# Patient Record
Sex: FEMALE | Race: BLACK OR AFRICAN AMERICAN | NOT HISPANIC OR LATINO | URBAN - NONMETROPOLITAN AREA
[De-identification: names, ages, dates, MRNs, and addresses within clinical notes are randomized per-mention and may not be internally consistent; named-entity substitution may affect disease eponyms.]

---

## 2016-09-12 PROBLEM — E11.9: Noted: 2020-07-15

## 2016-09-12 PROBLEM — H25.13: Noted: 2020-07-15

## 2019-09-09 ENCOUNTER — IMPORTED ENCOUNTER (OUTPATIENT)
Dept: URBAN - NONMETROPOLITAN AREA CLINIC 1 | Facility: CLINIC | Age: 74
End: 2019-09-09

## 2019-09-09 PROBLEM — E11.9: Noted: 2019-09-09

## 2019-09-09 PROCEDURE — 92014 COMPRE OPH EXAM EST PT 1/>: CPT

## 2019-09-09 PROCEDURE — 92015 DETERMINE REFRACTIVE STATE: CPT

## 2019-09-09 PROCEDURE — 92250 FUNDUS PHOTOGRAPHY W/I&R: CPT

## 2019-09-09 NOTE — PATIENT DISCUSSION
IDDM sans retinopathy OUDiscussed diagnosis with patient. Discussed the risk of diabetic damage of the retina with potential vision loss and the importance of routine follow-up. Emphasized strict blood sugar control. Optos done today; no BDR OUContinue to [place]ino OUDiscussed diagnosis with patientReviewed symptoms related to cataract progressionRecommend cataract evaluation by Dr. Hiral Molina defers treatment at this timeWill [facility] OS Discussed diagnosis with patientDiscussed findings of exam in detail with the patient. The risk of retinal detachment in patients with PVDs was discussed with the patient and the warning signs of retinal detachment were carefully reviewed with the patient. The patient was warned to return to the office or contact the ophthalmologist on call immediately if they experience signs of retinal detachment.   Presbyopia OUDiscussed diagnosis in detail with patientContinue to monitor; Dr's Notes: MR 12/15/17DFE 9/9/19OCT 6/15/18Optos 9/9/19

## 2020-02-24 NOTE — PATIENT DISCUSSION
New Prescription: erythromycin (erythromycin): ointment: 5 mg/gram (0.5 %) 1 a small amount at bedtime into right eye 02-

## 2020-02-24 NOTE — PATIENT DISCUSSION
BLUNT TRAUMA TO R ORBITAL WITH CONJ LACERATION AFTER FALL OD: AGGRESSIVE LUBRICATION. EMYCIN QHS. NO RETINAL HOLES OR TEARS. CALLBACK INSTRUCTIONS GIVEN.   FU WITH PCP, CONSIDER CT ORBITS FOR EVALUATION OF FRACTURES

## 2020-07-15 ENCOUNTER — IMPORTED ENCOUNTER (OUTPATIENT)
Dept: URBAN - NONMETROPOLITAN AREA CLINIC 1 | Facility: CLINIC | Age: 75
End: 2020-07-15

## 2020-07-15 PROCEDURE — 92014 COMPRE OPH EXAM EST PT 1/>: CPT

## 2020-07-15 NOTE — PATIENT DISCUSSION
Cataract(s)-Visually significant cataract OU .-Cataract(s) causing symptomatic impairment of visual function not correctable with a tolerable change in glasses or contact lenses lighting or non-operative means resulting in specific activity limitations and/or participation restrictions including but not limited to reading viewing television driving or meeting vocational or recreational needs. -Expectation is clearer vision and functional improvement in symptoms as well as reduced glare disability after cataract removal.-Order IOLMaster and OPD today. -Recommend Standard/traditional based on today's OPD testing and lifestyle questionnaire.-All questions were answered regarding surgery including pre and post-op medications appointments activity restrictions and anesthetic usage.-The risks benefits and alternatives and special risk factors for the patient were discussed in detail including but not limited to: bleeding infection retinal detachment vitreous loss problems with the implant and possible need for additional surgery.-Although rare the possibility of complete vision loss was discussed.-The possible need for glasses post-operatively was discussed.-Order medical clearance exam based on history of DM-Patient elects to proceed with cataract surgery OD .  Will schedule at patient's convenience and re-evaluate OS  in the future.; 's Notes: MR 12/15/17DFE 9/9/19OCT 6/15/18Optos 9/9/19

## 2020-08-17 ENCOUNTER — IMPORTED ENCOUNTER (OUTPATIENT)
Dept: URBAN - NONMETROPOLITAN AREA CLINIC 1 | Facility: CLINIC | Age: 75
End: 2020-08-17

## 2020-08-17 PROBLEM — E78.5: Noted: 2020-08-17

## 2020-08-17 PROBLEM — I10: Noted: 2020-08-17

## 2020-08-17 PROBLEM — E11.9: Noted: 2020-07-15

## 2020-08-17 PROBLEM — Z01.818: Noted: 2020-08-17

## 2020-08-17 NOTE — PATIENT DISCUSSION
Medical Clearance-Medical clearance done today. -No outstanding concerns that would preclude surgery.-Patient is cleared to proceed with scheduled surgery.; 's Notes: MR 12/15/17DFE 9/9/19OCT 6/15/18Optos 9/9/19

## 2020-08-25 ENCOUNTER — IMPORTED ENCOUNTER (OUTPATIENT)
Dept: URBAN - NONMETROPOLITAN AREA CLINIC 1 | Facility: CLINIC | Age: 75
End: 2020-08-25

## 2020-08-25 PROBLEM — Z98.41: Noted: 2020-08-25

## 2020-08-25 PROCEDURE — 99024 POSTOP FOLLOW-UP VISIT: CPT

## 2020-08-25 NOTE — PATIENT DISCUSSION
1 Day POV CE OD 8/24/20 Standard IOL-  The pt has undergone successful cataract extraction with Intraocular lens implantation in the right eye.-  IOP noted at 28 OD today instilled one drop of Alphagan P OD while in office today. -  Asked patient to continue postop medication regimen and add Alphagan OD QAM with sample given in office today. -  PO examination is normal and visual acuity has improved. -  The pt has been instructed to call the office immediately if there is increased redness pain or vision loss. -  Instructed patient not to rub the eye and don’t go swimming. -  Pt should return in 1 week for follow up.  -  Ocular meds plan discussed and patient received printed take home instructions.; 's Notes: MR 12/15/17DFE 9/9/19OCT 6/15/18Optos 9/9/19

## 2020-09-01 ENCOUNTER — IMPORTED ENCOUNTER (OUTPATIENT)
Dept: URBAN - NONMETROPOLITAN AREA CLINIC 1 | Facility: CLINIC | Age: 75
End: 2020-09-01

## 2020-09-01 PROCEDURE — 99024 POSTOP FOLLOW-UP VISIT: CPT

## 2020-09-01 NOTE — PATIENT DISCUSSION
Cataract(s)-Visually significant cataract OS . -Cataract(s) causing symptomatic impairment of visual function not correctable with a tolerable change in glasses or contact lenses lighting or non-operative means resulting in specific activity limitations and/or participation restrictions including but not limited to reading viewing television driving or meeting vocational or recreational needs. -Expectation is clearer vision and functional improvement in symptoms as well as reduced glare disability after cataract removal.-Recommend standard/traditional based on previous OPD testing and lifestyle questionnaire.-All questions were answered regarding surgery including pre and post-op medications appointments activity restrictions and anesthetic usage.-The risks benefits and alternatives and special risk factors for the patient were discussed in detail including but not limited to: bleeding infection retinal detachment vitreous loss problems with the implant and possible need for additional surgery.-Although rare the possibility of complete vision loss was discussed.-The need for glasses post-operatively was discussed.-Patient elects to proceed with cataract surgery OS . Will schedule at patient's convenience. s/p PCIOL-Pt doing well at 1 week s/p PCIOL. -Continue post-op gtts according to instruction sheet.-Okay to resume usual activites and d/c eye shield.; 's Notes: MR 12/15/17DFE 9/9/19OCT 6/15/18Optos 9/9/19

## 2020-09-03 PROBLEM — Z98.41: Noted: 2020-08-25

## 2020-09-03 PROBLEM — H25.12: Noted: 2020-09-03

## 2020-09-10 ENCOUNTER — IMPORTED ENCOUNTER (OUTPATIENT)
Dept: URBAN - NONMETROPOLITAN AREA CLINIC 1 | Facility: CLINIC | Age: 75
End: 2020-09-10

## 2020-09-10 PROBLEM — Z98.42: Noted: 2020-09-10

## 2020-09-10 PROBLEM — Z98.41: Noted: 2020-08-25

## 2020-09-10 PROCEDURE — 99024 POSTOP FOLLOW-UP VISIT: CPT

## 2020-09-10 NOTE — PATIENT DISCUSSION
1-Day POV CE OS 9/9/20 CE OD 8/24/20 Standard IOL OU- Discussed diagnosis in detail with patient- Patient is stable and doing well- Wound intact- Continue all post op drops as directed- Continue to monitor- RTC 1 week POV; 's Notes: MR 12/15/17DFE 9/9/19OCT 6/15/18Optos 9/9/19

## 2020-09-14 ENCOUNTER — IMPORTED ENCOUNTER (OUTPATIENT)
Dept: URBAN - NONMETROPOLITAN AREA CLINIC 1 | Facility: CLINIC | Age: 75
End: 2020-09-14

## 2020-09-14 PROCEDURE — 99024 POSTOP FOLLOW-UP VISIT: CPT

## 2020-09-14 NOTE — PATIENT DISCUSSION
5-Day POV CE OS 9/9/20 CE OD 8/24/20 Standard IOL OU- Discussed diagnosis in detail with patient- Patient is stable and doing well- Wound intact- Continue all post op drops as directed- Continue to monitor- RTC 3 week POV with ; 's Notes:  12/15/17DFE 9/9/19OCT 6/15/18Optos 9/9/19

## 2020-10-16 ENCOUNTER — IMPORTED ENCOUNTER (OUTPATIENT)
Dept: URBAN - NONMETROPOLITAN AREA CLINIC 1 | Facility: CLINIC | Age: 75
End: 2020-10-16

## 2020-10-16 PROCEDURE — 99024 POSTOP FOLLOW-UP VISIT: CPT

## 2020-10-16 NOTE — PATIENT DISCUSSION
4 week POV CE OS 9/9/20 CE OD 8/24/20 Standard IOL OU- Discussed diagnosis in detail with patient- Patient is stable and doing well- Wound intact- Finish all post op drops as directed- New glasses Rx given today- Continue to monitor- RTC 3 months for follow up with DFE; 's Notes: MR  10/16/20DFE 9/9/19OCT 6/15/18Optos 9/9/19

## 2021-01-25 ENCOUNTER — IMPORTED ENCOUNTER (OUTPATIENT)
Dept: URBAN - NONMETROPOLITAN AREA CLINIC 1 | Facility: CLINIC | Age: 76
End: 2021-01-25

## 2021-01-25 PROBLEM — E11.9: Noted: 2021-01-25

## 2021-01-25 PROBLEM — Z96.1: Noted: 2021-01-25

## 2021-01-25 PROCEDURE — 92012 INTRM OPH EXAM EST PATIENT: CPT

## 2021-01-25 NOTE — PATIENT DISCUSSION
P/C IOL OUDiscussed diagnosis in detail with patient. Both intraocular implants in place and stable. Continue to monitor. IDDM sans retinopathy OU Discussed diagnosis with patient. Discussed the risk of diabetic damage of the retina with potential vision loss and the importance of routine follow-up. Emphasized strict blood sugar control. Continue to monitor. RTC 1 year with Optos; 's Notes: MR  10/16/20DFE  1/25/21OCT 6/15/18Optos 9/9/19

## 2021-09-30 ENCOUNTER — IMPORTED ENCOUNTER (OUTPATIENT)
Dept: URBAN - NONMETROPOLITAN AREA CLINIC 1 | Facility: CLINIC | Age: 76
End: 2021-09-30

## 2021-09-30 PROBLEM — E11.9: Noted: 2021-09-30

## 2021-09-30 PROBLEM — H10.423: Noted: 2021-09-30

## 2021-09-30 PROBLEM — H02.834: Noted: 2021-09-30

## 2021-09-30 PROBLEM — Z96.1: Noted: 2021-09-30

## 2021-09-30 PROBLEM — H02.831: Noted: 2021-09-30

## 2021-09-30 PROCEDURE — 99213 OFFICE O/P EST LOW 20 MIN: CPT

## 2021-09-30 NOTE — PATIENT DISCUSSION
Ocular Allergies OUDiscussed diagnosis in detail with patient. Signs/symptoms discussed with patient. Papillae noted OD>OS. Start Pataday QD OU X 1 week then PRN. Patient to call if no improvement. Continue to monitor. Dermatochalasis BULDiscussed diagnosis in detail with patient. Recommend refer to Dr. Jony Ramirez for evaluation. Patient elects to schedule. P/C IOL OUDiscussed diagnosis in detail with patient. Both intraocular implants in place and stable. Continue to monitor. IDDM sans retinopathy OU Discussed diagnosis with patient. Discussed the risk of diabetic damage of the retina with potential vision loss and the importance of routine follow-up. Emphasized strict blood sugar control.  Continue to monitor.; 's Notes: MR  10/16/20DFE  1/25/21OCT 6/15/18Optos 9/9/19

## 2022-01-05 ENCOUNTER — IMPORTED ENCOUNTER (OUTPATIENT)
Dept: URBAN - NONMETROPOLITAN AREA CLINIC 1 | Facility: CLINIC | Age: 77
End: 2022-01-05

## 2022-01-05 PROBLEM — H02.834: Noted: 2022-01-05

## 2022-01-05 PROBLEM — H02.413: Noted: 2022-01-05

## 2022-01-05 PROBLEM — E11.9: Noted: 2022-01-05

## 2022-01-05 PROBLEM — Z96.1: Noted: 2022-01-05

## 2022-01-05 PROBLEM — H10.423: Noted: 2022-01-05

## 2022-01-05 PROBLEM — H02.831: Noted: 2022-01-05

## 2022-01-05 PROCEDURE — 99214 OFFICE O/P EST MOD 30 MIN: CPT

## 2022-01-05 NOTE — PATIENT DISCUSSION
"Ptosis-Ptosis (the upper eyelid being in a lower than normal position) of the upper eyelid was explained to the patient. -RBAs of ptosis repair discussed with patient. Treatment options include observation or surgical correction.-Will order ptosis VF and external photos and review results with patient.-Pt c/o having issues with her vision due to her lid drooping and she is unable to   see what is around her like she used to before. -Valium RX -MRD1 1 OU -BLF- 14 OU -TBUT- 14 OU -(-) LAG OU Task sent to [facility] Pt is on daily ASA 81 mg discussed stopping this x 3 weeks prior to sx *Discussed puffiness under her eyes discussed that is cosmetic* NOTES BELOW FROM PREVIOUS: Ocular Allergies OUDiscussed diagnosis in detail with patient. Signs/symptoms discussed with patient. Papillae noted OD>OS. Start Pataday QD OU X 1 week then PRN. Patient to call if no improvement. Continue to monitor. Dermatochalasis BULDiscussed diagnosis in detail with patient. Recommend refer to Dr. Leeann Luis for evaluation. Patient elects to schedule. P/C IOL OUDiscussed diagnosis in detail with patient. Both intraocular implants in place and stable. Continue to monitor. IDDM sans retinopathy OU Discussed diagnosis with patient. Discussed the risk of diabetic damage of the retina with potential vision loss and the importance of routine follow-up. Emphasized strict blood sugar control. Continue to monitor. ""; 's Notes: MR  10/16/20DFE  1/25/21OCT 6/15/18Optos 9/9/19"

## 2022-01-13 ENCOUNTER — IMPORTED ENCOUNTER (OUTPATIENT)
Dept: URBAN - NONMETROPOLITAN AREA CLINIC 1 | Facility: CLINIC | Age: 77
End: 2022-01-13

## 2022-01-13 NOTE — PATIENT DISCUSSION
"Ptosis-Ptosis (the upper eyelid being in a lower than normal position) of the upper eyelid was explained to the patient. -RBAs of ptosis repair discussed with patient. Treatment options include observation or surgical correction.-Will order ptosis VF and external photos and review results with patient.-Pt c/o having issues with her vision due to her lid drooping and she is unable to   see what is around her like she used to before. -Valium RX -MRD1 1 OU -BLF- 14 OU -TBUT- 14 OU -(-) LAG OU Task sent to [facility] Pt is on daily ASA 81 mg discussed stopping this x 3 weeks prior to sx *Discussed puffiness under her eyes discussed that is cosmetic* NOTES BELOW FROM PREVIOUS: Ocular Allergies OUDiscussed diagnosis in detail with patient. Signs/symptoms discussed with patient. Papillae noted OD>OS. Start Pataday QD OU X 1 week then PRN. Patient to call if no improvement. Continue to monitor. Dermatochalasis BULDiscussed diagnosis in detail with patient. Recommend refer to Dr. Megan Zamarripa for evaluation. Patient elects to schedule. P/C IOL OUDiscussed diagnosis in detail with patient. Both intraocular implants in place and stable. Continue to monitor. IDDM sans retinopathy OU Discussed diagnosis with patient. Discussed the risk of diabetic damage of the retina with potential vision loss and the importance of routine follow-up. Emphasized strict blood sugar control. Continue to monitor. ""; 's Notes: MR  10/16/20DFE  1/25/21OCT 6/15/18Optos 9/9/19"

## 2022-04-09 ASSESSMENT — VISUAL ACUITY
OD_GLARE: 20/80
OS_CC: 20/30-
OS_SC: 20/25
OS_CC: 20/25-
OD_GLARE: 20/50+
OD_GLARE: 20/80
OD_SC: 20/30-
OS_GLARE: 20/60
OU_CC: 20/25
OD_CC: 20/30-1
OS_PH: 20/40
OS_SC: 20/40
OD_CC: 20/20-
OD_PH: 20/40
OS_GLARE: 20/60
OS_AM: 20/20
OS_SC: 20/32-
OD_CC: 20/30-
OS_CC: 20/20-
OD_SC: 20/22
OS_GLARE: 20/60
OD_PAM: 20/20
OS_CC: 20/29-1
OS_GLARE: 20/50
OD_CC: 20/60
OD_SC: 20/40-1 SLOW
OD_CC: 20/29
OS_CC: 20/80-1
OS_CC: 20/60
OS_SC: 20/40+2
OD_CC: 20/40+
OS_AM: 20/20

## 2022-04-09 ASSESSMENT — TONOMETRY
OD_IOP_MMHG: 14
OS_IOP_MMHG: 16
OD_IOP_MMHG: 14
OS_IOP_MMHG: 14
OS_IOP_MMHG: 18
OD_IOP_MMHG: 14
OD_IOP_MMHG: 17
OD_IOP_MMHG: 18
OD_IOP_MMHG: 28
OS_IOP_MMHG: 16
OS_IOP_MMHG: 14
OD_IOP_MMHG: 15
OD_IOP_MMHG: 16
OS_IOP_MMHG: 16
OS_IOP_MMHG: 20
OS_IOP_MMHG: 16
OD_IOP_MMHG: 14
OS_IOP_MMHG: 18
OD_IOP_MMHG: 18
OS_IOP_MMHG: 14